# Patient Record
Sex: MALE | Race: BLACK OR AFRICAN AMERICAN | NOT HISPANIC OR LATINO | Employment: FULL TIME | URBAN - NONMETROPOLITAN AREA
[De-identification: names, ages, dates, MRNs, and addresses within clinical notes are randomized per-mention and may not be internally consistent; named-entity substitution may affect disease eponyms.]

---

## 2017-08-06 ENCOUNTER — HOSPITAL ENCOUNTER (EMERGENCY)
Facility: HOSPITAL | Age: 57
Discharge: LEFT BEFORE DISCHARGE | End: 2017-08-06
Attending: EMERGENCY MEDICINE | Admitting: EMERGENCY MEDICINE

## 2017-08-06 ENCOUNTER — APPOINTMENT (OUTPATIENT)
Dept: GENERAL RADIOLOGY | Facility: HOSPITAL | Age: 57
End: 2017-08-06

## 2017-08-06 VITALS
WEIGHT: 165 LBS | HEIGHT: 75 IN | BODY MASS INDEX: 20.51 KG/M2 | HEART RATE: 78 BPM | TEMPERATURE: 98.7 F | SYSTOLIC BLOOD PRESSURE: 119 MMHG | OXYGEN SATURATION: 95 % | DIASTOLIC BLOOD PRESSURE: 73 MMHG | RESPIRATION RATE: 18 BRPM

## 2017-08-06 DIAGNOSIS — F19.10 SUBSTANCE ABUSE (HCC): ICD-10-CM

## 2017-08-06 DIAGNOSIS — R79.89 ABNORMAL LFTS: ICD-10-CM

## 2017-08-06 DIAGNOSIS — R07.9 CHEST PAIN, UNSPECIFIED TYPE: Primary | ICD-10-CM

## 2017-08-06 LAB
ALBUMIN SERPL-MCNC: 4.8 G/DL (ref 3.4–4.8)
ALBUMIN/GLOB SERPL: 1.4 G/DL (ref 1.1–1.8)
ALP SERPL-CCNC: 73 U/L (ref 38–126)
ALT SERPL W P-5'-P-CCNC: 78 U/L (ref 21–72)
AMPHET+METHAMPHET UR QL: NEGATIVE
ANION GAP SERPL CALCULATED.3IONS-SCNC: 24 MMOL/L (ref 5–15)
APTT PPP: 26.3 SECONDS (ref 20–40.3)
AST SERPL-CCNC: 63 U/L (ref 17–59)
BARBITURATES UR QL SCN: NEGATIVE
BASOPHILS # BLD AUTO: 0.01 10*3/MM3 (ref 0–0.2)
BASOPHILS NFR BLD AUTO: 0.2 % (ref 0–2)
BENZODIAZ UR QL SCN: NEGATIVE
BILIRUB SERPL-MCNC: 1.1 MG/DL (ref 0.2–1.3)
BUN BLD-MCNC: 22 MG/DL (ref 7–21)
BUN/CREAT SERPL: 18 (ref 7–25)
CALCIUM SPEC-SCNC: 9.5 MG/DL (ref 8.4–10.2)
CANNABINOIDS SERPL QL: NEGATIVE
CHLORIDE SERPL-SCNC: 90 MMOL/L (ref 95–110)
CK MB SERPL-CCNC: 2.87 NG/ML (ref 0–5)
CK SERPL-CCNC: 138 U/L (ref 55–170)
CO2 SERPL-SCNC: 24 MMOL/L (ref 22–31)
COCAINE UR QL: POSITIVE
CREAT BLD-MCNC: 1.22 MG/DL (ref 0.7–1.3)
D-DIMER, QUANTITATIVE (MAD,POW, STR): <270 NG/ML (FEU) (ref 0–470)
DEPRECATED RDW RBC AUTO: 41.1 FL (ref 35.1–43.9)
EOSINOPHIL # BLD AUTO: 0.13 10*3/MM3 (ref 0–0.7)
EOSINOPHIL NFR BLD AUTO: 2.8 % (ref 0–7)
ERYTHROCYTE [DISTWIDTH] IN BLOOD BY AUTOMATED COUNT: 12.9 % (ref 11.5–14.5)
ETHANOL BLD-MCNC: <10 MG/DL (ref 0–10)
ETHANOL UR QL: <0.01 %
GFR SERPL CREATININE-BSD FRML MDRD: 74 ML/MIN/1.73 (ref 56–130)
GLOBULIN UR ELPH-MCNC: 3.4 GM/DL (ref 2.3–3.5)
GLUCOSE BLD-MCNC: 94 MG/DL (ref 60–100)
HCT VFR BLD AUTO: 46.8 % (ref 39–49)
HGB BLD-MCNC: 17 G/DL (ref 13.7–17.3)
HOLD SPECIMEN: NORMAL
HOLD SPECIMEN: NORMAL
IMM GRANULOCYTES # BLD: 0.01 10*3/MM3 (ref 0–0.02)
IMM GRANULOCYTES NFR BLD: 0.2 % (ref 0–0.5)
INR PPP: 0.97 (ref 0.8–1.2)
LIPASE SERPL-CCNC: 180 U/L (ref 23–300)
LYMPHOCYTES # BLD AUTO: 1.6 10*3/MM3 (ref 0.6–4.2)
LYMPHOCYTES NFR BLD AUTO: 34 % (ref 10–50)
MCH RBC QN AUTO: 31.5 PG (ref 26.5–34)
MCHC RBC AUTO-ENTMCNC: 36.3 G/DL (ref 31.5–36.3)
MCV RBC AUTO: 86.7 FL (ref 80–98)
METHADONE UR QL SCN: NEGATIVE
MONOCYTES # BLD AUTO: 0.58 10*3/MM3 (ref 0–0.9)
MONOCYTES NFR BLD AUTO: 12.3 % (ref 0–12)
NEUTROPHILS # BLD AUTO: 2.37 10*3/MM3 (ref 2–8.6)
NEUTROPHILS NFR BLD AUTO: 50.5 % (ref 37–80)
NT-PROBNP SERPL-MCNC: 54 PG/ML (ref 0–900)
OPIATES UR QL: NEGATIVE
OXYCODONE UR QL SCN: NEGATIVE
PLATELET # BLD AUTO: 214 10*3/MM3 (ref 150–450)
PMV BLD AUTO: 8.9 FL (ref 8–12)
POTASSIUM BLD-SCNC: 4.3 MMOL/L (ref 3.5–5.1)
PROT SERPL-MCNC: 8.2 G/DL (ref 6.3–8.6)
PROTHROMBIN TIME: 12.8 SECONDS (ref 11.1–15.3)
RBC # BLD AUTO: 5.4 10*6/MM3 (ref 4.37–5.74)
SODIUM BLD-SCNC: 138 MMOL/L (ref 137–145)
TROPONIN I SERPL-MCNC: <0.012 NG/ML
WBC NRBC COR # BLD: 4.7 10*3/MM3 (ref 3.2–9.8)
WHOLE BLOOD HOLD SPECIMEN: NORMAL
WHOLE BLOOD HOLD SPECIMEN: NORMAL

## 2017-08-06 PROCEDURE — 85610 PROTHROMBIN TIME: CPT | Performed by: EMERGENCY MEDICINE

## 2017-08-06 PROCEDURE — 93005 ELECTROCARDIOGRAM TRACING: CPT

## 2017-08-06 PROCEDURE — 84484 ASSAY OF TROPONIN QUANT: CPT | Performed by: EMERGENCY MEDICINE

## 2017-08-06 PROCEDURE — 80307 DRUG TEST PRSMV CHEM ANLYZR: CPT | Performed by: EMERGENCY MEDICINE

## 2017-08-06 PROCEDURE — 93010 ELECTROCARDIOGRAM REPORT: CPT | Performed by: INTERNAL MEDICINE

## 2017-08-06 PROCEDURE — 84484 ASSAY OF TROPONIN QUANT: CPT

## 2017-08-06 PROCEDURE — 82550 ASSAY OF CK (CPK): CPT | Performed by: EMERGENCY MEDICINE

## 2017-08-06 PROCEDURE — 85379 FIBRIN DEGRADATION QUANT: CPT | Performed by: EMERGENCY MEDICINE

## 2017-08-06 PROCEDURE — 85025 COMPLETE CBC W/AUTO DIFF WBC: CPT

## 2017-08-06 PROCEDURE — 83880 ASSAY OF NATRIURETIC PEPTIDE: CPT

## 2017-08-06 PROCEDURE — 99284 EMERGENCY DEPT VISIT MOD MDM: CPT

## 2017-08-06 PROCEDURE — 83690 ASSAY OF LIPASE: CPT | Performed by: EMERGENCY MEDICINE

## 2017-08-06 PROCEDURE — 85730 THROMBOPLASTIN TIME PARTIAL: CPT | Performed by: EMERGENCY MEDICINE

## 2017-08-06 PROCEDURE — 82553 CREATINE MB FRACTION: CPT | Performed by: EMERGENCY MEDICINE

## 2017-08-06 PROCEDURE — 80053 COMPREHEN METABOLIC PANEL: CPT

## 2017-08-06 PROCEDURE — 71020 HC CHEST PA AND LATERAL: CPT

## 2017-08-06 PROCEDURE — 96360 HYDRATION IV INFUSION INIT: CPT

## 2017-08-06 RX ORDER — SODIUM CHLORIDE 0.9 % (FLUSH) 0.9 %
10 SYRINGE (ML) INJECTION AS NEEDED
Status: DISCONTINUED | OUTPATIENT
Start: 2017-08-06 | End: 2017-08-06 | Stop reason: HOSPADM

## 2017-08-06 RX ORDER — SODIUM CHLORIDE 9 MG/ML
125 INJECTION, SOLUTION INTRAVENOUS CONTINUOUS
Status: DISCONTINUED | OUTPATIENT
Start: 2017-08-06 | End: 2017-08-06 | Stop reason: HOSPADM

## 2017-08-06 RX ORDER — ASPIRIN 325 MG
325 TABLET ORAL ONCE
Status: COMPLETED | OUTPATIENT
Start: 2017-08-06 | End: 2017-08-06

## 2017-08-06 RX ORDER — NITROGLYCERIN 0.4 MG/1
0.4 TABLET SUBLINGUAL
Status: DISCONTINUED | OUTPATIENT
Start: 2017-08-06 | End: 2017-08-06 | Stop reason: HOSPADM

## 2017-08-06 RX ORDER — ASPIRIN 325 MG
325 TABLET, DELAYED RELEASE (ENTERIC COATED) ORAL DAILY
Qty: 30 TABLET | Refills: 0 | Status: SHIPPED | OUTPATIENT
Start: 2017-08-06

## 2017-08-06 RX ORDER — NITROGLYCERIN 0.4 MG/1
0.4 TABLET SUBLINGUAL
Qty: 30 TABLET | Refills: 0 | Status: SHIPPED | OUTPATIENT
Start: 2017-08-06 | End: 2019-06-03

## 2017-08-06 RX ADMIN — ASPIRIN 325 MG: 325 TABLET, COATED ORAL at 17:42

## 2017-08-06 RX ADMIN — SODIUM CHLORIDE 125 ML/HR: 9 INJECTION, SOLUTION INTRAVENOUS at 19:02

## 2017-08-06 RX ADMIN — Medication 10 ML: at 18:55

## 2017-08-06 NOTE — ED NOTES
Patient reports he just got back from bathroom, unable to provide urine specimen     Vanessa Herman LPN  08/06/17 7602

## 2017-08-06 NOTE — ED PROVIDER NOTES
Subjective   HPI Comments: 55yo male presents ED c/o onset left sided chest pain 'heaviness' 1600hrs/radiating LUE/associated diaphoresis, tachycardia/neg exac or relieve factors; pt rates pain 1/10 at time of exam.  ROS (+) 2 day binge etoh/thc/cocaine last used last noc.    Patient is a 56 y.o. male presenting with chest pain.   Chest Pain   Pain location:  L chest  Pain quality: aching and tightness    Pain radiates to:  L arm  Pain severity:  Mild  Onset quality:  Sudden  Duration:  3 hours  Progression:  Improving  Chronicity:  New  Associated symptoms: palpitations        Review of Systems   Constitutional: Negative.    HENT: Negative.    Eyes: Negative.    Respiratory: Negative.    Cardiovascular: Positive for chest pain and palpitations. Negative for leg swelling.   Gastrointestinal: Negative.    Endocrine: Negative.    Genitourinary: Negative.    Musculoskeletal: Negative.    Skin: Negative.    Neurological: Negative for syncope.       History reviewed. No pertinent past medical history.    No Known Allergies    Past Surgical History:   Procedure Laterality Date   • ANKLE SURGERY         History reviewed. No pertinent family history.    Social History     Social History   • Marital status:      Spouse name: N/A   • Number of children: N/A   • Years of education: N/A     Social History Main Topics   • Smoking status: Current Every Day Smoker     Packs/day: 0.50     Types: Cigarettes   • Smokeless tobacco: None   • Alcohol use Yes   • Drug use: No   • Sexual activity: Not Asked     Other Topics Concern   • None     Social History Narrative   • None           Objective   Physical Exam   Constitutional: He is oriented to person, place, and time. He appears well-developed and well-nourished.   HENT:   Head: Normocephalic and atraumatic.   Mouth/Throat: Oropharynx is clear and moist.   Eyes: Pupils are equal, round, and reactive to light.   Neck: Neck supple. No JVD present. No tracheal deviation present.    Cardiovascular: Normal rate, regular rhythm, normal heart sounds and intact distal pulses.  Exam reveals no gallop and no friction rub.    No murmur heard.  Pulmonary/Chest: Effort normal and breath sounds normal. He has no wheezes. He has no rales. He exhibits no tenderness.   Abdominal: Soft. Bowel sounds are normal. There is no tenderness. There is no rebound and no guarding.   Musculoskeletal: Normal range of motion. He exhibits no edema or tenderness.   Lymphadenopathy:     He has no cervical adenopathy.   Neurological: He is alert and oriented to person, place, and time.   Skin: Skin is warm and dry.   Nursing note and vitals reviewed.      ECG 12 Lead    Date/Time: 8/6/2017 6:51 PM  Performed by: PIO CAM  Authorized by: PIO CAM   Interpreted by physician  Rhythm: sinus rhythm  Rate: normal  BPM: 92  QRS axis: normal  Conduction: conduction normal  ST Segments: ST segments normal  T Waves: T waves normal  Other: no other findings  Clinical impression: normal ECG               ED Course  ED Course            HEART Score  History: Slightly suspicious (+0)  ECG: Non specific repolarization disturbance (+1)  Age: 45 through 65 (+1)  Risk Factors: 1 - 2 risk factors (+1)  Troponin: Normal limit or lower (+0)  Total: 3       Labs Reviewed   COMPREHENSIVE METABOLIC PANEL - Abnormal; Notable for the following:        Result Value    BUN 22 (*)     Chloride 90 (*)     ALT (SGPT) 78 (*)     AST (SGOT) 63 (*)     Anion Gap 24.0 (*)     All other components within normal limits   CBC WITH AUTO DIFFERENTIAL - Abnormal; Notable for the following:     Monocyte % 12.3 (*)     All other components within normal limits   TROPONIN (IN-HOUSE) - Normal   TROPONIN (IN-HOUSE) - Normal   BNP (IN-HOUSE) - Normal   PROTIME-INR - Normal    Narrative:     Therapeutic range for most indications is 2.0-3.0 INR,  or 2.5-3.5 for mechanical heart valves.   APTT - Normal    Narrative:     The recommended Heparin therapeutic  range is 68-97 seconds.   TROPONIN (IN-HOUSE) - Normal   CK - Normal   CK MB - Normal   LIPASE - Normal   D-DIMER, QUANTITATIVE - Normal    Narrative:     Dimer values <500 ng/ml FEU are FDA approved as aid in diagnosis of deep venous thrombosis and pulmonary embolism.  This test should not be used in an exclusion strategy with pretest probability alone.    A recent guideline regarding diagnosis for pulmonary thomboembolism recommends an adjusted exclusion criterion of age x 10 ng/ml FEU for patients >50 years of age ([employer] Intern Med 2015; 163: 701-711).   RAINBOW DRAW    Narrative:     The following orders were created for panel order [place] Draw.  Procedure                               Abnormality         Status                     ---------                               -----------         ------                     Light Blue Top[[id number]]                                   Final result               Green Top (Gel)[[id number]]                                  Final result               Lavender Top[[id number]]                                     Final result               Gold Top - SST[[id number]]                                   Final result                 Please view results for these tests on the individual orders.   ETHANOL   TROPONIN (IN-HOUSE)   TROPONIN (IN-HOUSE)   URINE DRUG SCREEN   CBC AND DIFFERENTIAL    Narrative:     The following orders were created for panel order CBC & Differential.  Procedure                               Abnormality         Status                     ---------                               -----------         ------                     CBC Auto Differential[[id number]]        Abnormal            Final result                 Please view results for these tests on the individual orders.   LIGHT BLUE TOP   GREEN TOP   LAVENDER TOP   GOLD TOP - SST     Xr Chest 2 View    Result Date: 8/6/2017  Narrative: Patient Name:  LUKE JACK Patient ID:  [id number] Ordering:  [facility] Referring:  [facility] ------------------------------------------------ TWO VIEW CHEST HISTORY: Chest pain. Right-sided chest tightness for two hours. Frontal and lateral films of the chest were obtained. COMPARISON: April 28, 2014 The lungs are clear of an acute process. The heart is not enlarged. The pulmonary vasculature is not increased. No pleural effusion. No pneumothorax. No acute osseous abnormality. Mild dextroscoliosis thoracic spine. Old left rib fracture.     Impression: CONCLUSION: No Acute Disease [id number] Electronically signed by:  Raoul Newman MD  8/6/2017 5:15 PM CDT Workstation: [id number]        [facility]    Final diagnoses:   Chest pain, unspecified type   Substance abuse   Abnormal LFTs            Bryson Catalan MD  08/06/17 2009

## 2017-08-06 NOTE — ED NOTES
"Patient presented to ED with C/O chest pain that began about 3 hours ago. Patient States,\"Its probably because I partied too much\",\"I drank last 2 nights\".     Vanessa Herman LPN  08/06/17 5864    "

## 2017-08-07 NOTE — DISCHARGE INSTRUCTIONS
Recommend cardiology evaluation/stress test within 72 hrs  Return ED chest pain, shortness of air, syncope, palpitations, worse condition, other concerns  Hypertension  Hypertension, commonly called high blood pressure, is when the force of blood pumping through your arteries is too strong. Your arteries are the blood vessels that carry blood from your heart throughout your body. A blood pressure reading consists of a higher number over a lower number, such as 110/72. The higher number (systolic) is the pressure inside your arteries when your heart pumps. The lower number (diastolic) is the pressure inside your arteries when your heart relaxes. Ideally you want your blood pressure below 120/80.  Hypertension forces your heart to work harder to pump blood. Your arteries may become narrow or stiff. Having untreated or uncontrolled hypertension can cause heart attack, stroke, kidney disease, and other problems.  RISK FACTORS  Some risk factors for high blood pressure are controllable. Others are not.   Risk factors you cannot control include:   · Race. You may be at higher risk if you are .  · Age. Risk increases with age.  · Gender. Men are at higher risk than women before age 45 years. After age 65, women are at higher risk than men.  Risk factors you can control include:  · Not getting enough exercise or physical activity.  · Being overweight.  · Getting too much fat, sugar, calories, or salt in your diet.  · Drinking too much alcohol.  SIGNS AND SYMPTOMS  Hypertension does not usually cause signs or symptoms. Extremely high blood pressure (hypertensive crisis) may cause headache, anxiety, shortness of breath, and nosebleed.  DIAGNOSIS  To check if you have hypertension, your health care provider will measure your blood pressure while you are seated, with your arm held at the level of your heart. It should be measured at least twice using the same arm. Certain conditions can cause a difference in  blood pressure between your right and left arms. A blood pressure reading that is higher than normal on one occasion does not mean that you need treatment. If it is not clear whether you have high blood pressure, you may be asked to return on a different day to have your blood pressure checked again. Or, you may be asked to monitor your blood pressure at home for 1 or more weeks.  TREATMENT  Treating high blood pressure includes making lifestyle changes and possibly taking medicine. Living a healthy lifestyle can help lower high blood pressure. You may need to change some of your habits.  Lifestyle changes may include:  · Following the DASH diet. This diet is high in fruits, vegetables, and whole grains. It is low in salt, red meat, and added sugars.  · Keep your sodium intake below 2,300 mg per day.  · Getting at least 30-45 minutes of aerobic exercise at least 4 times per week.  · Losing weight if necessary.  · Not smoking.  · Limiting alcoholic beverages.  · Learning ways to reduce stress.  Your health care provider may prescribe medicine if lifestyle changes are not enough to get your blood pressure under control, and if one of the following is true:  · You are 18-59 years of age and your systolic blood pressure is above 140.  · You are 60 years of age or older, and your systolic blood pressure is above 150.  · Your diastolic blood pressure is above 90.  · You have diabetes, and your systolic blood pressure is over 140 or your diastolic blood pressure is over 90.  · You have kidney disease and your blood pressure is above 140/90.  · You have heart disease and your blood pressure is above 140/90.  Your personal target blood pressure may vary depending on your medical conditions, your age, and other factors.  HOME CARE INSTRUCTIONS  · Have your blood pressure rechecked as directed by your health care provider.    · Take medicines only as directed by your health care provider. Follow the directions carefully. Blood  pressure medicines must be taken as prescribed. The medicine does not work as well when you skip doses. Skipping doses also puts you at risk for problems.  · Do not smoke.    · Monitor your blood pressure at home as directed by your health care provider.   SEEK MEDICAL CARE IF:   · You think you are having a reaction to medicines taken.  · You have recurrent headaches or feel dizzy.  · You have swelling in your ankles.  · You have trouble with your vision.  SEEK IMMEDIATE MEDICAL CARE IF:  · You develop a severe headache or confusion.  · You have unusual weakness, numbness, or feel faint.  · You have severe chest or abdominal pain.  · You vomit repeatedly.  · You have trouble breathing.  MAKE SURE YOU:   · Understand these instructions.  · Will watch your condition.  · Will get help right away if you are not doing well or get worse.     This information is not intended to replace advice given to you by your health care provider. Make sure you discuss any questions you have with your health care provider.     Document Released: 12/18/2006 Document Revised: 05/03/2016 Document Reviewed: 10/10/2014  [employer] Interactive Patient Education ©2017 [employer]

## 2017-12-25 ENCOUNTER — HOSPITAL ENCOUNTER (EMERGENCY)
Facility: HOSPITAL | Age: 57
Discharge: HOME | End: 2017-12-25
Attending: FAMILY MEDICINE | Admitting: FAMILY MEDICINE

## 2017-12-25 VITALS
OXYGEN SATURATION: 95 % | TEMPERATURE: 100.8 F | SYSTOLIC BLOOD PRESSURE: 124 MMHG | HEIGHT: 75 IN | RESPIRATION RATE: 16 BRPM | DIASTOLIC BLOOD PRESSURE: 61 MMHG | BODY MASS INDEX: 20.51 KG/M2 | HEART RATE: 99 BPM | WEIGHT: 165 LBS

## 2017-12-25 DIAGNOSIS — J10.1 INFLUENZA A: Primary | ICD-10-CM

## 2017-12-25 LAB
FLUAV AG NPH QL: POSITIVE
FLUBV AG NPH QL IA: NEGATIVE
S PYO AG THROAT QL: NEGATIVE

## 2017-12-25 PROCEDURE — 87804 INFLUENZA ASSAY W/OPTIC: CPT | Performed by: FAMILY MEDICINE

## 2017-12-25 PROCEDURE — 99284 EMERGENCY DEPT VISIT MOD MDM: CPT

## 2017-12-25 PROCEDURE — 87081 CULTURE SCREEN ONLY: CPT | Performed by: FAMILY MEDICINE

## 2017-12-25 PROCEDURE — 87880 STREP A ASSAY W/OPTIC: CPT | Performed by: FAMILY MEDICINE

## 2017-12-25 RX ORDER — ONDANSETRON 4 MG/1
4 TABLET, ORALLY DISINTEGRATING ORAL ONCE
Status: COMPLETED | OUTPATIENT
Start: 2017-12-25 | End: 2017-12-25

## 2017-12-25 RX ORDER — ACETAMINOPHEN 500 MG
500 TABLET ORAL EVERY 6 HOURS PRN
Qty: 28 TABLET | Refills: 0 | Status: SHIPPED | OUTPATIENT
Start: 2017-12-25 | End: 2018-01-01

## 2017-12-25 RX ORDER — ACETAMINOPHEN 500 MG
500 TABLET ORAL ONCE
Status: COMPLETED | OUTPATIENT
Start: 2017-12-25 | End: 2017-12-25

## 2017-12-25 RX ORDER — OSELTAMIVIR PHOSPHATE 75 MG/1
75 CAPSULE ORAL ONCE
Status: COMPLETED | OUTPATIENT
Start: 2017-12-25 | End: 2017-12-25

## 2017-12-25 RX ORDER — OSELTAMIVIR PHOSPHATE 75 MG/1
75 CAPSULE ORAL 2 TIMES DAILY
Qty: 10 CAPSULE | Refills: 0 | Status: SHIPPED | OUTPATIENT
Start: 2017-12-25 | End: 2017-12-30

## 2017-12-25 RX ORDER — ACETAMINOPHEN 325 MG/1
650 TABLET ORAL ONCE
Status: DISCONTINUED | OUTPATIENT
Start: 2017-12-25 | End: 2017-12-25

## 2017-12-25 RX ADMIN — OSELTAMIVIR PHOSPHATE 75 MG: 75 CAPSULE ORAL at 16:08

## 2017-12-25 RX ADMIN — ONDANSETRON 4 MG: 4 TABLET, ORALLY DISINTEGRATING ORAL at 16:08

## 2017-12-25 RX ADMIN — ACETAMINOPHEN 500 MG: 500 TABLET ORAL at 16:08

## 2017-12-28 LAB — BACTERIA SPEC AEROBE CULT: NORMAL

## 2018-03-07 ENCOUNTER — OFFICE VISIT (OUTPATIENT)
Dept: PODIATRY | Facility: CLINIC | Age: 58
End: 2018-03-07

## 2018-03-07 VITALS
OXYGEN SATURATION: 96 % | WEIGHT: 155.4 LBS | BODY MASS INDEX: 19.32 KG/M2 | HEIGHT: 75 IN | DIASTOLIC BLOOD PRESSURE: 88 MMHG | HEART RATE: 82 BPM | SYSTOLIC BLOOD PRESSURE: 122 MMHG

## 2018-03-07 DIAGNOSIS — M79.671 BILATERAL FOOT PAIN: Primary | ICD-10-CM

## 2018-03-07 DIAGNOSIS — M79.672 BILATERAL FOOT PAIN: Primary | ICD-10-CM

## 2018-03-07 PROCEDURE — 99203 OFFICE O/P NEW LOW 30 MIN: CPT | Performed by: PODIATRIST

## 2018-03-07 NOTE — PROGRESS NOTES
"Han Mcgee  1960  57 y.o. male   Patient presents today for bilateral foot pain and calluses.     03/07/2018    Chief Complaint   Patient presents with   • Right Foot - Pain, Callouses   • Left Foot - Pain, Callouses           History of Present Illness    Han Mcgee is a 57 y.o.male who presents to clinic today with chief complaint of foot pain.  Pain is located to the bottom of both his feet.  He relates to painful calluses.  He describes the pain as achy with weightbearing.  He rates it as a 4 out of 10.  He has done nothing to treat it.  He denies any injuries.  He has no other pedal complaints.      History reviewed. No pertinent past medical history.      Past Surgical History:   Procedure Laterality Date   • ANKLE SURGERY           Family History   Problem Relation Age of Onset   • Diabetes Mother    • Cancer Mother    • No Known Problems Father        No Known Allergies    Social History     Social History   • Marital status:      Spouse name: N/A   • Number of children: N/A   • Years of education: N/A     Occupational History   • Not on file.     Social History Main Topics   • Smoking status: Current Every Day Smoker     Packs/day: 0.50     Types: Cigarettes   • Smokeless tobacco: Never Used   • Alcohol use Yes   • Drug use: No   • Sexual activity: Not on file     Other Topics Concern   • Not on file     Social History Narrative         Current Outpatient Prescriptions   Medication Sig Dispense Refill   • aspirin  MG tablet Take 1 tablet by mouth Daily. 30 tablet 0   • nitroglycerin (NITROSTAT) 0.4 MG SL tablet Place 1 tablet under the tongue Every 5 (Five) Minutes As Needed for Chest Pain. Take no more than 3 doses in 15 minutes. 30 tablet 0     No current facility-administered medications for this visit.          OBJECTIVE    /88  Pulse 82  Ht 190.5 cm (75\")  Wt 70.5 kg (155 lb 6.4 oz)  SpO2 96%  BMI 19.42 kg/m2      Review of Systems   HENT: Negative.  "   Respiratory: Negative.    Cardiovascular: Negative.    Gastrointestinal: Negative.    Endocrine: Negative.    Genitourinary: Negative.    Musculoskeletal:        Foot pain   Neurological: Negative.    Hematological: Negative.            Physical Exam   Constitutional: He is oriented to person, place, and time. He appears well-developed and well-nourished. No distress.   HENT:   Head: Normocephalic and atraumatic.   Nose: Nose normal.   Pulmonary/Chest: Effort normal. He has no wheezes.   Neurological: He is alert and oriented to person, place, and time. He has normal reflexes.   Psychiatric: He has a normal mood and affect. His behavior is normal.   Vitals reviewed.      Lower Extremity    Cardiovascular:    DP/PT pulses palpable    CFT brisk  to all digits  Skin temp is warm to warm from proximal tibia to distal digits  Pedal hair growth present.   No erythema or edema noted     Musculoskeletal:  Muscle strength is 5/5 for all muscle groups tested   ROM of the 1st MTP is full without pain or crepitus  ROM of the MTJ is full without pain or crepitus    ROM of the STJ is full without pain or crepitus    ROM of the ankle joint is full without pain or crepitus  pes planus   Mild HAV bilateral     Dermatological:   Skin is warm, dry and intact    Webspaces 1-4 bilateral are clean, dry and intact.   No subcutaneous nodules or masses noted    No open wounds noted   Hyperkeratotic lesions noted to the plantar aspect of bilateral feet    Neurological:   Protective sensation intact    Sensation intact to light touch      Radiographs: 3 views the right left foot were obtained today.  No acute osseous abnormalities noted.    Procedures        ASSESSMENT AND PLAN    Han was seen today for pain, callouses, pain and callouses.    Diagnoses and all orders for this visit:    Bilateral foot pain  -     XR foot 3+ vw bilateral    - Comprehensive foot and ankle exam performed.   - X-rays taken and reviewed  - Educated patient on  proper callus care  - Recommended AmLactin and a pumice stone daily  - rx for custom orthotics  - All questions were answered to the patients satisfaction.  - RTC  As needed          This document has been electronically signed by Sean Camilo DPM on March 9, 2018 8:20 AM     3/9/2018  8:20 AM

## 2018-03-12 ENCOUNTER — TRANSCRIBE ORDERS (OUTPATIENT)
Dept: PODIATRY | Facility: CLINIC | Age: 58
End: 2018-03-12

## 2018-03-12 DIAGNOSIS — M21.42 PES PLANUS OF BOTH FEET: ICD-10-CM

## 2018-03-12 DIAGNOSIS — M79.672 FOOT PAIN, BILATERAL: Primary | ICD-10-CM

## 2018-03-12 DIAGNOSIS — M21.41 PES PLANUS OF BOTH FEET: ICD-10-CM

## 2018-03-12 DIAGNOSIS — M79.671 FOOT PAIN, BILATERAL: Primary | ICD-10-CM

## 2018-03-19 ENCOUNTER — HOSPITAL ENCOUNTER (OUTPATIENT)
Dept: PHYSICAL THERAPY | Facility: HOSPITAL | Age: 58
Setting detail: THERAPIES SERIES
Discharge: HOME | End: 2018-03-19

## 2018-03-19 DIAGNOSIS — M79.672 BILATERAL FOOT PAIN: Primary | ICD-10-CM

## 2018-03-19 DIAGNOSIS — M79.671 BILATERAL FOOT PAIN: Primary | ICD-10-CM

## 2018-03-19 PROCEDURE — 97161 PT EVAL LOW COMPLEX 20 MIN: CPT | Performed by: PHYSICAL THERAPIST

## 2018-03-19 NOTE — PROGRESS NOTES
Outpatient Physical Therapy Ortho Initial Evaluation  [facility]     Patient Name: Han Mcgee  : 1960  MRN: [id number]  Today's Date: 3/19/2018      Visit Date: 2018         No past medical history on file.     Past Surgical History:   Procedure Laterality Date   • ANKLE SURGERY     Medications:    aspirin  MG tablet    nitroglycerin (NITROSTAT) 0.4 MG SL tablet     ALLERGIES: NKDA  Visit Dx:     ICD-10-CM ICD-9-CM   1. Bilateral foot pain M79.671 729.5    M79.672                  PT Ortho     Row Name 18 1400       Subjective Comments    Subjective Comments pain on the bottom of both feet.  Callouses present  -DD       Subjective Pain    Pre-Treatment Pain Level 4  -DD    Post-Treatment Pain Level 4  -DD       Special Tests/Palpation    Special Tests/Palpation --   Metatarsal heads bilaterally  -DD       Sensation    Sensation WNL? WNL  -DD    Light Touch No apparent deficits  -DD       Support Device Management    Type (Support/Positioning Device) bilateral   Tenderfoot medium full length post neutral  -DD    Fabrication Comment (Support/Positioning Device) cast molded in subtalar neutal  -DD    Functional Design (Support/Positioning Device) dynamic orthosis  -DD    Therapeutic Indications (Support/Positioning Device) stabilization and support  -DD    Wearing Schedule (Support/Positioning Device) wear with activity/work  -DD      User Key  (r) = Recorded By, (t) = Taken By, (c) = Cosigned By    Initials Name Provider Type    VALENTIN Gil, PT Physical Therapist                          PT OP Goals     Row Name 18 1000          PT Short Term Goals    STG Date to Achieve 18  -DD     STG 1 Patient will be independent in care and wear schedule of the orthotic  -DD     STG 2 Patient will understand indications for follow-up and need of adjustments  -DD     STG 3 Patient will have a comfortable fit  -DD        Time Calculation    PT Goal Re-Cert Due  Date 04/09/18  -DD       User Key  (r) = Recorded By, (t) = Taken By, (c) = Cosigned By    Initials Name Provider Type    VALENTIN Gil PT Physical Therapist                PT Assessment/Plan     Row Name 03/19/18 1057          PT Assessment    Functional Limitations Impaired gait  -DD     Impairments Pain;Poor body mechanics  -DD     Assessment Comments Patient has low arches and  presents  in loafers this date, could use support of longitudiinal arches to prevent pain.  -DD     Please refer to paper survey for additional self-reported information No  -DD     Rehab Potential Good  -DD     Patient/caregiver participated in establishment of treatment plan and goals Yes  -DD        PT Plan    PT Frequency 1x/week  -DD     Planned CPT's? PT EVAL LOW COMPLEXITY: 08464;PT ORTHOTIC MGMT/TRAIN EA 15 MIN: 08811  -DD     Physical Therapy Interventions (Optional Details) orthotic fitting/training  -DD     PT Plan Comments Pt will return in 2-3 weeks for fitting.  Educate in care and wear schedule.  -DD       User Key  (r) = Recorded By, (t) = Taken By, (c) = Cosigned By    Initials Name Provider Type    VALENTIN Gil, PT Physical Therapist                  Exercises     Row Name 03/19/18 1400             Subjective Comments    Subjective Comments pain on the bottom of both feet.  Callouses present  -DD         Subjective Pain    Pre-Treatment Pain Level 4  -DD      Post-Treatment Pain Level 4  -DD        User Key  (r) = Recorded By, (t) = Taken By, (c) = Cosigned By    Initials Name Provider Type    VALENTIN Gil PT Physical Therapist         Time Calculation:   Start Time: 1015  Stop Time: 1050  Time Calculation (min): 35 min  Total Timed Code Minutes- PT: 0 minute(s)     Therapy Charges for Today     Code Description Service Date Service Provider Modifiers Qty    55009670641 HC PT EVAL LOW COMPLEXITY 1 3/19/2018 Emily Gil, PT GP 1    [id number]  PT-CUSTOM ORTHOTICS-LEVEL 2  3/19/2018 Emily Gil, PT  1                Emily Gil, PT, ATC, DPT  3/19/2018

## 2019-05-23 ENCOUNTER — TELEPHONE (OUTPATIENT)
Dept: PODIATRY | Facility: CLINIC | Age: 59
End: 2019-05-23

## 2019-05-23 NOTE — TELEPHONE ENCOUNTER
I called this gentleman but his VM is not set up so I could not leave a message. He can continue doing what he is currently if he calls back.  He did not keep his past 3 appts.

## 2019-05-23 NOTE — TELEPHONE ENCOUNTER
YUNIEL:    PATIENT STOPPED AT  TO MAKE APPOINTMENT AND HE HAS BUNION ON TOP OF FOOT THAT HAS A LITTLE OPEN AREA, HE WAS NEEDING TO KNOW WHAT TO DO UNTIL HIS APPOINTMENT.  HE HAS BEEN NEOSPOREN AND BANDAID

## 2019-05-23 NOTE — TELEPHONE ENCOUNTER
Told patient without seeing his wound I could not give instructions. Continue to do what you are currently and we will see you at your scheduled appointment.

## 2019-06-03 ENCOUNTER — OFFICE VISIT (OUTPATIENT)
Dept: PODIATRY | Facility: CLINIC | Age: 59
End: 2019-06-03

## 2019-06-03 ENCOUNTER — TRANSCRIBE ORDERS (OUTPATIENT)
Dept: PODIATRY | Facility: CLINIC | Age: 59
End: 2019-06-03

## 2019-06-03 VITALS — BODY MASS INDEX: 18.65 KG/M2 | HEART RATE: 53 BPM | WEIGHT: 150 LBS | HEIGHT: 75 IN | OXYGEN SATURATION: 98 %

## 2019-06-03 DIAGNOSIS — M20.42 HAMMERTOES OF BOTH FEET: ICD-10-CM

## 2019-06-03 DIAGNOSIS — M20.42 HAMMER TOES OF BOTH FEET: ICD-10-CM

## 2019-06-03 DIAGNOSIS — M20.41 HAMMER TOES OF BOTH FEET: ICD-10-CM

## 2019-06-03 DIAGNOSIS — M20.11 VALGUS DEFORMITY OF BOTH GREAT TOES: Primary | ICD-10-CM

## 2019-06-03 DIAGNOSIS — M20.41 HAMMERTOES OF BOTH FEET: ICD-10-CM

## 2019-06-03 DIAGNOSIS — M20.12 VALGUS DEFORMITY OF BOTH GREAT TOES: Primary | ICD-10-CM

## 2019-06-03 PROCEDURE — 99213 OFFICE O/P EST LOW 20 MIN: CPT | Performed by: PODIATRIST

## 2019-06-03 RX ORDER — METHYLPREDNISOLONE 4 MG/1
TABLET ORAL
Refills: 0 | COMMUNITY
Start: 2019-05-14

## 2019-06-03 RX ORDER — IBUPROFEN 800 MG/1
TABLET ORAL
Refills: 0 | COMMUNITY
Start: 2019-05-14

## 2019-06-03 RX ORDER — METHOCARBAMOL 750 MG/1
TABLET, FILM COATED ORAL
Refills: 0 | COMMUNITY
Start: 2019-05-14

## 2019-06-03 NOTE — PROGRESS NOTES
"Han Mcgee  1960  58 y.o. male     Patient presents today for bilateral foot pain and calluses.     06/03/2019     Chief Complaint   Patient presents with   • Right Foot - Bunions     History of Present Illness    Han Mcgee is a 58 y.o.male who presents to clinic with chief complaint of left foot toe deformity.  Patient states that his second toe is rubbing and shoe gear causing him pain.   he states that a sore has developed on top of the toe which she has been treating.  The toe  sore has improved.  Patient also wishes to receive another pair of custom orthotics.  He states that once he has significantly helped his foot pain.  He denies any new injuries or trauma to his feet.  He has no other pedal complaints.      History reviewed. No pertinent past medical history.      Past Surgical History:   Procedure Laterality Date   • ANKLE SURGERY           Family History   Problem Relation Age of Onset   • Diabetes Mother    • Cancer Mother    • No Known Problems Father        No Known Allergies    Social History     Socioeconomic History   • Marital status:      Spouse name: Not on file   • Number of children: Not on file   • Years of education: Not on file   • Highest education level: Not on file   Tobacco Use   • Smoking status: Current Every Day Smoker     Packs/day: 0.50     Types: Cigarettes   • Smokeless tobacco: Never Used   Substance and Sexual Activity   • Alcohol use: Yes   • Drug use: No         Current Outpatient Medications   Medication Sig Dispense Refill   • aspirin  MG tablet Take 1 tablet by mouth Daily. 30 tablet 0   • ibuprofen (ADVIL,MOTRIN) 800 MG tablet TK 1 T PO  Q 8 H PRN P  0   • methocarbamol (ROBAXIN) 750 MG tablet TK ONE T PO NIGHTLY PRN  0   • methylPREDNISolone (MEDROL, CHARLY,) 4 MG tablet TK UTD  0     No current facility-administered medications for this visit.          OBJECTIVE    Pulse 53   Ht 190.5 cm (75\")   Wt 68 kg (150 lb)   SpO2 98%   " BMI 18.75 kg/m²       Review of Systems   HENT: Negative.    Respiratory: Negative.    Cardiovascular: Negative.    Gastrointestinal: Negative.    Musculoskeletal:        Foot pain   Skin: Negative.    Neurological: Negative.    Hematological: Negative.    Psychiatric/Behavioral: Negative.            Physical Exam   Constitutional: He is oriented to person, place, and time. He appears well-developed and well-nourished. No distress.   HENT:   Head: Normocephalic and atraumatic.   Nose: Nose normal.   Eyes: Conjunctivae and EOM are normal. Pupils are equal, round, and reactive to light.   Pulmonary/Chest: Effort normal and breath sounds normal.   Neurological: He is alert and oriented to person, place, and time. He has normal reflexes.   Skin: Skin is warm and dry. Capillary refill takes less than 2 seconds.   Psychiatric: He has a normal mood and affect. His behavior is normal.   Vitals reviewed.      Lower Extremity    Cardiovascular:    DP/PT pulses palpable    CFT brisk  to all digits  Skin temp is warm to warm from proximal tibia to distal digits  Pedal hair growth present.   No erythema or edema noted     Musculoskeletal:  Muscle strength is 5/5 for all muscle groups tested   ROM of the 1st MTP is full without pain or crepitus  ROM of the MTJ is full without pain or crepitus    ROM of the STJ is full without pain or crepitus    ROM of the ankle joint is full without pain or crepitus  pes planus   HAV bilateral   Contracted second and third digits bilateral    Dermatological:   Skin is warm, dry and intact    Webspaces 1-4 bilateral are clean, dry and intact.   No subcutaneous nodules or masses noted    No open wounds noted   Hyperkeratotic tissue noted to dorsal left second toe    Neurological:   Protective sensation intact    Sensation intact to light touch        Procedures        ASSESSMENT AND PLAN    Han was seen today for bunions.    Diagnoses and all orders for this visit:    Valgus deformity of both  great toes    Hammer toes of both feet      -Reviewed all radiographs.  -Conservative and surgical treatment options discussed for hammertoes and bunion.  Dispensed gel toe sleeve.  -Rx for new custom orthotics  -All his questions were answered  -Recheck as needed          This document has been electronically signed by Sean Camilo DPM on Claire 3, 2019 12:15 PM     6/3/2019  12:15 PM